# Patient Record
Sex: MALE | Race: WHITE | NOT HISPANIC OR LATINO | Employment: FULL TIME | URBAN - NONMETROPOLITAN AREA
[De-identification: names, ages, dates, MRNs, and addresses within clinical notes are randomized per-mention and may not be internally consistent; named-entity substitution may affect disease eponyms.]

---

## 2022-01-04 ENCOUNTER — OFFICE VISIT (OUTPATIENT)
Dept: FAMILY MEDICINE CLINIC | Facility: CLINIC | Age: 27
End: 2022-01-04

## 2022-01-04 VITALS
WEIGHT: 214 LBS | DIASTOLIC BLOOD PRESSURE: 78 MMHG | OXYGEN SATURATION: 99 % | HEIGHT: 68 IN | BODY MASS INDEX: 32.43 KG/M2 | SYSTOLIC BLOOD PRESSURE: 126 MMHG | TEMPERATURE: 96.8 F | HEART RATE: 84 BPM

## 2022-01-04 DIAGNOSIS — Z00.8 ENCOUNTER FOR WORK CAPABILITY ASSESSMENT: ICD-10-CM

## 2022-01-04 DIAGNOSIS — Z00.00 WELL ADULT EXAM: Primary | ICD-10-CM

## 2022-01-04 DIAGNOSIS — Z13.220 SCREENING FOR HYPERLIPIDEMIA: ICD-10-CM

## 2022-01-04 DIAGNOSIS — E66.1 CLASS 1 DRUG-INDUCED OBESITY WITHOUT SERIOUS COMORBIDITY WITH BODY MASS INDEX (BMI) OF 32.0 TO 32.9 IN ADULT: Chronic | ICD-10-CM

## 2022-01-04 PROCEDURE — 99385 PREV VISIT NEW AGE 18-39: CPT | Performed by: PHYSICIAN ASSISTANT

## 2022-01-04 PROCEDURE — 3008F BODY MASS INDEX DOCD: CPT | Performed by: PHYSICIAN ASSISTANT

## 2022-01-04 PROCEDURE — 2014F MENTAL STATUS ASSESS: CPT | Performed by: PHYSICIAN ASSISTANT

## 2022-01-04 NOTE — PROGRESS NOTES
"Subjective   Bautista Rodriguez is a 26 y.o. male.       Chief Complaint -establish care and annual well visit    History of Present Illness -    ROS    He is here today to establish care as a new patient.    Well visit-  He is doing well with no reported significant current or past medical problems.  He states that he is applying for a job hopefully working with  students or .  He is needing a preemployment physical today and TB skin test.  His past medical social and surgical histories were updated.    The following portions of the patient's history were reviewed and updated as appropriate: allergies, current medications, past family history, past medical history, past social history, past surgical history and problem list.    Review of Systems    Objective  Vital signs:  /78 Comment: rechecked  Pulse 84   Temp 96.8 °F (36 °C) (Temporal)   Ht 172.7 cm (68\")   Wt 97.1 kg (214 lb)   SpO2 99%   BMI 32.54 kg/m²     Physical Exam  Vitals and nursing note reviewed.   Constitutional:       Appearance: Normal appearance. He is well-developed.   HENT:      Head: Normocephalic and atraumatic.      Right Ear: Tympanic membrane normal.      Left Ear: Tympanic membrane normal.      Nose: Nose normal.      Mouth/Throat:      Mouth: Mucous membranes are moist.      Pharynx: No oropharyngeal exudate or posterior oropharyngeal erythema.   Eyes:      Extraocular Movements: Extraocular movements intact.      Conjunctiva/sclera: Conjunctivae normal.   Neck:      Thyroid: No thyromegaly.      Trachea: No tracheal deviation.   Cardiovascular:      Rate and Rhythm: Normal rate and regular rhythm.      Heart sounds: Normal heart sounds. No murmur heard.      Pulmonary:      Effort: Pulmonary effort is normal. No respiratory distress.      Breath sounds: Normal breath sounds. No wheezing.   Abdominal:      General: Bowel sounds are normal.      Palpations: Abdomen is soft.      Tenderness: There is no abdominal " tenderness. There is no guarding.   Musculoskeletal:         General: No tenderness. Normal range of motion.      Cervical back: Normal range of motion and neck supple.   Lymphadenopathy:      Cervical: No cervical adenopathy.   Skin:     General: Skin is warm and dry.      Findings: No rash.   Neurological:      General: No focal deficit present.      Mental Status: He is alert and oriented to person, place, and time.   Psychiatric:         Mood and Affect: Mood normal.         Behavior: Behavior normal.         Thought Content: Thought content normal.       Vision Screening (01/04/2022)  Edited by: Rachell Irving PA   Right eye Left eye Both eyes   Without correction 20/25 20/40 20/20     Depression screening was reviewed with patient.  He is not interested in treatment for anxiety or depression at this time.  He was advised conservative measures.               Assessment/Plan     Diagnoses and all orders for this visit:    1. Well adult exam (Primary)  Comments:  Paperwork was filled out for preemployment physical today.    Patient is cleared to work with no restrictions  Orders:  -     Comprehensive Metabolic Panel; Future    2. Screening for hyperlipidemia  -     Lipid Panel; Future    3. 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  He was advised on the importance of maintaining a healthy diet and regular exercise to achieve and maintain healthy weight.  He was advised on the importance of regular dental care and to report any changes in mental health.  He was advised that should he desire any counseling or treatment for depression or anxiety he is welcome to call the office and that will be provided.  He was advised to continue cessation of tobacco, alcohol, and  drugs.        Patient was given instructions and counseling regarding his condition or for health maintenance advice. Please see specific information pulled into the AVS if appropriate      This document has been electronically signed by:  Rachell Irving PA-C

## 2022-01-05 PROBLEM — E66.1 CLASS 1 DRUG-INDUCED OBESITY WITHOUT SERIOUS COMORBIDITY WITH BODY MASS INDEX (BMI) OF 32.0 TO 32.9 IN ADULT: Status: ACTIVE | Noted: 2022-01-05

## 2022-01-05 NOTE — PATIENT INSTRUCTIONS
Calorie Counting for Weight Loss  Calories are units of energy. Your body needs a certain number of calories from food to keep going throughout the day. When you eat or drink more calories than your body needs, your body stores the extra calories mostly as fat. When you eat or drink fewer calories than your body needs, your body burns fat to get the energy it needs.  Calorie counting means keeping track of how many calories you eat and drink each day. Calorie counting can be helpful if you need to lose weight. If you eat fewer calories than your body needs, you should lose weight. Ask your health care provider what a healthy weight is for you.  For calorie counting to work, you will need to eat the right number of calories each day to lose a healthy amount of weight per week. A dietitian can help you figure out how many calories you need in a day and will suggest ways to reach your calorie goal.  · A healthy amount of weight to lose each week is usually 1-2 lb (0.5-0.9 kg). This usually means that your daily calorie intake should be reduced by 500-750 calories.  · Eating 1,200-1,500 calories a day can help most women lose weight.  · Eating 1,500-1,800 calories a day can help most men lose weight.  What do I need to know about calorie counting?  Work with your health care provider or dietitian to determine how many calories you should get each day. To meet your daily calorie goal, you will need to:  · Find out how many calories are in each food that you would like to eat. Try to do this before you eat.  · Decide how much of the food you plan to eat.  · Keep a food log. Do this by writing down what you ate and how many calories it had.  To successfully lose weight, it is important to balance calorie counting with a healthy lifestyle that includes regular activity.  Where do I find calorie information?    The number of calories in a food can be found on a Nutrition Facts label. If a food does not have a Nutrition Facts  label, try to look up the calories online or ask your dietitian for help.  Remember that calories are listed per serving. If you choose to have more than one serving of a food, you will have to multiply the calories per serving by the number of servings you plan to eat. For example, the label on a package of bread might say that a serving size is 1 slice and that there are 90 calories in a serving. If you eat 1 slice, you will have eaten 90 calories. If you eat 2 slices, you will have eaten 180 calories.  How do I keep a food log?  After each time that you eat, record the following in your food log as soon as possible:  · What you ate. Be sure to include toppings, sauces, and other extras on the food.  · How much you ate. This can be measured in cups, ounces, or number of items.  · How many calories were in each food and drink.  · The total number of calories in the food you ate.  Keep your food log near you, such as in a pocket-sized notebook or on an rachell or website on your mobile phone. Some programs will calculate calories for you and show you how many calories you have left to meet your daily goal.  What are some portion-control tips?  · Know how many calories are in a serving. This will help you know how many servings you can have of a certain food.  · Use a measuring cup to measure serving sizes. You could also try weighing out portions on a kitchen scale. With time, you will be able to estimate serving sizes for some foods.  · Take time to put servings of different foods on your favorite plates or in your favorite bowls and cups so you know what a serving looks like.  · Try not to eat straight from a food's packaging, such as from a bag or box. Eating straight from the package makes it hard to see how much you are eating and can lead to overeating. Put the amount you would like to eat in a cup or on a plate to make sure you are eating the right portion.  · Use smaller plates, glasses, and bowls for smaller  portions and to prevent overeating.  · Try not to multitask. For example, avoid watching TV or using your computer while eating. If it is time to eat, sit down at a table and enjoy your food. This will help you recognize when you are full. It will also help you be more mindful of what and how much you are eating.  What are tips for following this plan?  Reading food labels  · Check the calorie count compared with the serving size. The serving size may be smaller than what you are used to eating.  · Check the source of the calories. Try to choose foods that are high in protein, fiber, and vitamins, and low in saturated fat, trans fat, and sodium.  Shopping  · Read nutrition labels while you shop. This will help you make healthy decisions about which foods to buy.  · Pay attention to nutrition labels for low-fat or fat-free foods. These foods sometimes have the same number of calories or more calories than the full-fat versions. They also often have added sugar, starch, or salt to make up for flavor that was removed with the fat.  · Make a grocery list of lower-calorie foods and stick to it.  Cooking  · Try to cook your favorite foods in a healthier way. For example, try baking instead of frying.  · Use low-fat dairy products.  Meal planning  · Use more fruits and vegetables. One-half of your plate should be fruits and vegetables.  · Include lean proteins, such as chicken, turkey, and fish.  Lifestyle  Each week, aim to do one of the following:  · 150 minutes of moderate exercise, such as walking.  · 75 minutes of vigorous exercise, such as running.  General information  · Know how many calories are in the foods you eat most often. This will help you calculate calorie counts faster.  · Find a way of tracking calories that works for you. Get creative. Try different apps or programs if writing down calories does not work for you.  What foods should I eat?    · Eat nutritious foods. It is better to have a nutritious,  high-calorie food, such as an avocado, than a food with few nutrients, such as a bag of potato chips.  · Use your calories on foods and drinks that will fill you up and will not leave you hungry soon after eating.  ? Examples of foods that fill you up are nuts and nut butters, vegetables, lean proteins, and high-fiber foods such as whole grains. High-fiber foods are foods with more than 5 g of fiber per serving.  · Pay attention to calories in drinks. Low-calorie drinks include water and unsweetened drinks.  The items listed above may not be a complete list of foods and beverages you can eat. Contact a dietitian for more information.  What foods should I limit?  Limit foods or drinks that are not good sources of vitamins, minerals, or protein or that are high in unhealthy fats. These include:  · Candy.  · Other sweets.  · Sodas, specialty coffee drinks, alcohol, and juice.  The items listed above may not be a complete list of foods and beverages you should avoid. Contact a dietitian for more information.  How do I count calories when eating out?  · Pay attention to portions. Often, portions are much larger when eating out. Try these tips to keep portions smaller:  ? Consider sharing a meal instead of getting your own.  ? If you get your own meal, eat only half of it. Before you start eating, ask for a container and put half of your meal into it.  ? When available, consider ordering smaller portions from the menu instead of full portions.  · Pay attention to your food and drink choices. Knowing the way food is cooked and what is included with the meal can help you eat fewer calories.  ? If calories are listed on the menu, choose the lower-calorie options.  ? Choose dishes that include vegetables, fruits, whole grains, low-fat dairy products, and lean proteins.  ? Choose items that are boiled, broiled, grilled, or steamed. Avoid items that are buttered, battered, fried, or served with cream sauce. Items labeled as  crispy are usually fried, unless stated otherwise.  ? Choose water, low-fat milk, unsweetened iced tea, or other drinks without added sugar. If you want an alcoholic beverage, choose a lower-calorie option, such as a glass of wine or light beer.  ? Ask for dressings, sauces, and syrups on the side. These are usually high in calories, so you should limit the amount you eat.  ? If you want a salad, choose a garden salad and ask for grilled meats. Avoid extra toppings such as mann, cheese, or fried items. Ask for the dressing on the side, or ask for olive oil and vinegar or lemon to use as dressing.  · Estimate how many servings of a food you are given. Knowing serving sizes will help you be aware of how much food you are eating at restaurants.  Where to find more information  · Centers for Disease Control and Prevention: www.cdc.gov  · [place] Department of Agriculture: [employer].gov  Summary  · Calorie counting means keeping track of how many calories you eat and drink each day. If you eat fewer calories than your body needs, you should lose weight.  · A healthy amount of weight to lose per week is usually 1-2 lb (0.5-0.9 kg). This usually means reducing your daily calorie intake by 500-750 calories.  · The number of calories in a food can be found on a Nutrition Facts label. If a food does not have a Nutrition Facts label, try to look up the calories online or ask your dietitian for help.  · Use smaller plates, glasses, and bowls for smaller portions and to prevent overeating.  · Use your calories on foods and drinks that will fill you up and not leave you hungry shortly after a meal.  This information is not intended to replace advice given to you by your health care provider. Make sure you discuss any questions you have with your health care provider.  Document Revised: 01/28/2021 Document Reviewed: 01/28/2021  Elsevier Patient Education © 2021 [employer] Inc[employer]      Major Depressive Disorder, Adult  Major depressive  disorder (MDD) is a mental health condition. It may also be called clinical depression or unipolar depression. MDD causes symptoms of sadness, hopelessness, and loss of interest in things. These symptoms last most of the day, almost every day, for 2 weeks. MDD can also cause physical symptoms. It can interfere with relationships and with everyday activities, such as work, school, and activities that are usually pleasant.  MDD may be mild, moderate, or severe. It may be single-episode MDD, which happens once, or recurrent MDD, which may occur multiple times.  What are the causes?  The exact cause of this condition is not known. MDD is most likely caused by a combination of things, which may include:  · Your personality traits.  · Cutler or conditioned behaviors or thoughts or feelings that reinforce negativity.  · Any alcohol or substance misuse.  · Long-term (chronic) physical or mental health illness.  · Going through a traumatic experience or major life changes.  What increases the risk?  The following factors may make someone more likely to develop MDD:  · A family history of depression.  · Being a woman.  · Troubled family relationships.  · Abnormally low levels of certain brain chemicals.  · Traumatic or painful events in childhood, especially abuse or loss of a parent.  · A lot of stress from life experiences, such as poor living conditions or discrimination.  · Chronic physical illness or other mental health disorders.  What are the signs or symptoms?  The main symptoms of MDD usually include:  · Constant depressed or irritable mood.  · A loss of interest in things and activities.  Other symptoms include:  · Sleeping or eating too much or too little.  · Unexplained weight gain or weight loss.  · Tiredness or low energy.  · Being agitated, restless, or weak.  · Feeling hopeless, worthless, or guilty.  · Trouble thinking clearly or making decisions.  · Thoughts of suicide or thoughts of harming  others.  · Isolating oneself or avoiding other people or activities.  · Trouble completing tasks, work, or any normal obligations.  Severe symptoms of this condition may include:  · Psychotic depression.This may include false beliefs, or delusions. It may also include seeing, hearing, tasting, smelling, or feeling things that are not real (hallucinations).  · Chronic depression or persistent depressive disorder. This is low-level depression that lasts for at least 2 years.  · Melancholic depression, or feeling extremely sad and hopeless.  · Catatonic depression, which includes trouble speaking and trouble moving.  How is this diagnosed?  This condition may be diagnosed based on:  · Your symptoms.  · Your medical and mental health history. You may be asked questions about your lifestyle, including any drug and alcohol use.  · A physical exam.  · Blood tests to rule out other conditions.  MDD is confirmed if you have the following symptoms most of the day, nearly every day, in a 2-week period:  · Either a depressed mood or loss of interest.  · At least four other MDD symptoms.  How is this treated?  This condition is usually treated by mental health professionals, such as psychologists, psychiatrists, and clinical social workers. You may need more than one type of treatment. Treatment may include:  · Psychotherapy, also called talk therapy or counseling. Types of psychotherapy include:  ? Cognitive behavioral therapy (CBT). This teaches you to recognize unhealthy feelings, thoughts, and behaviors, and replace them with positive thoughts and actions.  ? Interpersonal therapy (IPT). This helps you to improve the way you communicate with others or relate to them.  ? Family therapy. This treatment includes members of your family.  · Medicines to treat anxiety and depression. These medicines help to balance the brain chemicals that affect your emotions.  · Lifestyle changes. You may be asked to:  ? Limit alcohol use and  avoid drug use.  ? Get regular exercise.  ? Get plenty of sleep.  ? Make healthy eating choices.  ? Spend more time outdoors.  · Brain stimulation. This may be done if symptoms are very severe and other treatments have not worked. Examples of this treatment are electroconvulsive therapy and transcranial magnetic stimulation.  Follow these instructions at home:  Activity  · Exercise regularly and spend time outdoors.  · Find activities that you enjoy doing, and make time to do them.  · Find healthy ways to manage stress, such as:  ? Meditation or deep breathing.  ? Spending time in nature.  ? Journaling.  · Return to your normal activities as told by your health care provider. Ask your health care provider what activities are safe for you.  Alcohol and drug use  · If you drink alcohol:  ? Limit how much you use to:  § 0-1 drink a day for women who are not pregnant.  § 0-2 drinks a day for men.  ? Be aware of how much alcohol is in your drink. In the [place] one drink equals one 12 oz bottle of beer (355 mL), one 5 oz glass of wine (148 mL), or one 1½ oz glass of hard liquor (44 mL).  ? Discuss your alcohol use with your health care provider. Alcohol can affect any antidepressant medicines you are taking.  · Discuss any drug use with your health care provider.  General instructions    · Take over-the-counter and prescription medicines only as told by your health care provider.  · Eat a healthy diet and get plenty of sleep.  · Consider joining a support group. Your health care provider may be able to recommend one.  · Keep all follow-up visits as told by your health care provider. This is important.    Where to find more information  · National [place] on Mental Illness: www.sebas.org  · [place] National [place] of Mental Health: www.nimh.nih.gov  Contact a health care provider if:  · Your symptoms get worse.  · You develop new symptoms.  Get help right away if:  · You self-harm.  · You have serious thoughts about hurting  yourself or others.  · You hallucinate.  If you ever feel like you may hurt yourself or others, or have thoughts about taking your own life, get help right away. Go to your nearest emergency department or:  · Call your local emergency services (771 in the [place]  · Call a suicide crisis helpline, such as the National Suicide Prevention Lifeline at 1-629.618.6004. This is open 24 hours a day in the [place]  · Text the Crisis Text Line at 728866 (in the [place]  Summary  · Major depressive disorder (MDD) is a mental health condition. MDD causes symptoms of sadness, hopelessness, and loss of interest in things. These symptoms last most of the day, almost every day, for 2 weeks.  · The symptoms of MDD can interfere with relationships and with everyday activities.  · Treatments and support are available for people who develop MDD. You may need more than one type of treatment.  · Get help right away if you have serious thoughts about hurting yourself or others.  This information is not intended to replace advice given to you by your health care provider. Make sure you discuss any questions you have with your health care provider.  Document Revised: 11/28/2020 Document Reviewed: 11/28/2020  Elsevier Patient Education © 2021 [employer]vier Inc.

## 2022-01-06 ENCOUNTER — LAB (OUTPATIENT)
Dept: FAMILY MEDICINE CLINIC | Facility: CLINIC | Age: 27
End: 2022-01-06

## 2022-01-06 DIAGNOSIS — Z13.220 SCREENING FOR HYPERLIPIDEMIA: ICD-10-CM

## 2022-01-06 DIAGNOSIS — Z00.00 WELL ADULT EXAM: ICD-10-CM

## 2022-01-11 ENCOUNTER — PATIENT ROUNDING (BHMG ONLY) (OUTPATIENT)
Dept: FAMILY MEDICINE CLINIC | Facility: CLINIC | Age: 27
End: 2022-01-11

## 2022-01-11 NOTE — PROGRESS NOTES
January 11, 2022    Hello, may I speak with Bautista Rodriguez?    My name is Cecilia Briceno     I am  with [facility]  [address] [place] [id number] [phone].    Before we get started may I verify your date of birth? 1995    I am calling to officially welcome you to our practice and ask about your recent visit. Is this a good time to talk? yes    Tell me about your visit with us. What things went well?  Ms Lovett listened to me and I had no issues. I liked the office.        We're always looking for ways to make our patients' experiences even better. Do you have recommendations on ways we may improve?  no    Overall were you satisfied with your first visit to our practice? yes       I appreciate you taking the time to speak with me today. Is there anything else I can do for you? no      Thank you, and have a great day.

## 2022-01-14 ENCOUNTER — TELEPHONE (OUTPATIENT)
Dept: FAMILY MEDICINE CLINIC | Facility: CLINIC | Age: 27
End: 2022-01-14

## 2022-01-14 DIAGNOSIS — E66.1 CLASS 1 DRUG-INDUCED OBESITY WITHOUT SERIOUS COMORBIDITY WITH BODY MASS INDEX (BMI) OF 32.0 TO 32.9 IN ADULT: Primary | ICD-10-CM

## 2022-01-14 NOTE — TELEPHONE ENCOUNTER
Called patient and his voicemail box is full. Will send patient a letter.    ----- Message from DAREN Sibley sent at 1/14/2022  3:26 PM EST -----  I put in the order.  Thanks  ----- Message -----  From: Fifi Paredes MA  Sent: 1/14/2022   3:20 PM EST  To: DAREN Sibley    It will need to be reordered since it was collected on in the office. It was hemolyzed so that's why it needs to be redrawn.   ----- Message -----  From: Rachell Irving PA  Sent: 1/14/2022   3:08 PM EST  To: Fifi Paredes MA    Okay. Please inform the patient to come back in and have it redrawn. Can they use the existing order do you need me to place a new order for CMP? If it was not run properly then they should be able to use the existing order.  ----- Message -----  From: Fifi Paredes MA  Sent: 1/12/2022   9:29 AM EST  To: DAREN Sibley    Lab called and said that he needs his CMP redrawn.

## 2022-01-20 ENCOUNTER — LAB (OUTPATIENT)
Dept: FAMILY MEDICINE CLINIC | Facility: CLINIC | Age: 27
End: 2022-01-20

## 2022-01-20 DIAGNOSIS — Z00.8 ENCOUNTER FOR WORK CAPABILITY ASSESSMENT: Primary | ICD-10-CM

## 2022-01-20 DIAGNOSIS — E66.1 CLASS 1 DRUG-INDUCED OBESITY WITHOUT SERIOUS COMORBIDITY WITH BODY MASS INDEX (BMI) OF 32.0 TO 32.9 IN ADULT: ICD-10-CM

## 2022-01-20 PROCEDURE — 36415 COLL VENOUS BLD VENIPUNCTURE: CPT | Performed by: PHYSICIAN ASSISTANT

## 2022-01-20 PROCEDURE — 80053 COMPREHEN METABOLIC PANEL: CPT | Performed by: PHYSICIAN ASSISTANT

## 2022-01-21 DIAGNOSIS — Z00.00 ANNUAL PHYSICAL EXAM: Primary | ICD-10-CM

## 2022-01-21 LAB
ALBUMIN SERPL-MCNC: 4.3 G/DL (ref 3.5–5.2)
ALBUMIN/GLOB SERPL: 1.3 G/DL
ALP SERPL-CCNC: 79 U/L (ref 39–117)
ALT SERPL W P-5'-P-CCNC: 40 U/L (ref 1–41)
ANION GAP SERPL CALCULATED.3IONS-SCNC: 11.6 MMOL/L (ref 5–15)
AST SERPL-CCNC: 23 U/L (ref 1–40)
BILIRUB SERPL-MCNC: 0.4 MG/DL (ref 0–1.2)
BUN SERPL-MCNC: 13 MG/DL (ref 6–20)
BUN/CREAT SERPL: 12.5 (ref 7–25)
CALCIUM SPEC-SCNC: 9.8 MG/DL (ref 8.6–10.5)
CHLORIDE SERPL-SCNC: 103 MMOL/L (ref 98–107)
CO2 SERPL-SCNC: 27.4 MMOL/L (ref 22–29)
CREAT SERPL-MCNC: 1.04 MG/DL (ref 0.76–1.27)
GFR SERPL CREATININE-BSD FRML MDRD: 85 ML/MIN/1.73
GLOBULIN UR ELPH-MCNC: 3.2 GM/DL
GLUCOSE SERPL-MCNC: 90 MG/DL (ref 65–99)
POTASSIUM SERPL-SCNC: 4.1 MMOL/L (ref 3.5–5.2)
PROT SERPL-MCNC: 7.5 G/DL (ref 6–8.5)
SODIUM SERPL-SCNC: 142 MMOL/L (ref 136–145)

## 2022-01-25 DIAGNOSIS — Z79.899 ENCOUNTER FOR LONG-TERM (CURRENT) USE OF MEDICATIONS: Primary | ICD-10-CM

## 2023-03-22 ENCOUNTER — OFFICE VISIT (OUTPATIENT)
Dept: FAMILY MEDICINE CLINIC | Facility: CLINIC | Age: 28
End: 2023-03-22
Payer: COMMERCIAL

## 2023-03-22 VITALS
BODY MASS INDEX: 32.29 KG/M2 | HEIGHT: 69 IN | WEIGHT: 218 LBS | OXYGEN SATURATION: 97 % | SYSTOLIC BLOOD PRESSURE: 130 MMHG | HEART RATE: 111 BPM | DIASTOLIC BLOOD PRESSURE: 80 MMHG | TEMPERATURE: 98 F

## 2023-03-22 DIAGNOSIS — M67.911 ROTATOR CUFF DISORDER, RIGHT: ICD-10-CM

## 2023-03-22 DIAGNOSIS — K62.5 BRBPR (BRIGHT RED BLOOD PER RECTUM): Primary | ICD-10-CM

## 2023-03-22 DIAGNOSIS — G89.29 CHRONIC RIGHT SHOULDER PAIN: ICD-10-CM

## 2023-03-22 DIAGNOSIS — M25.511 CHRONIC RIGHT SHOULDER PAIN: ICD-10-CM

## 2023-03-22 DIAGNOSIS — M25.311 SHOULDER INSTABILITY, RIGHT: ICD-10-CM

## 2023-03-22 PROCEDURE — 99214 OFFICE O/P EST MOD 30 MIN: CPT | Performed by: PHYSICIAN ASSISTANT

## 2023-03-22 PROCEDURE — 1159F MED LIST DOCD IN RCRD: CPT | Performed by: PHYSICIAN ASSISTANT

## 2023-03-22 PROCEDURE — 1160F RVW MEDS BY RX/DR IN RCRD: CPT | Performed by: PHYSICIAN ASSISTANT

## 2023-03-22 NOTE — PROGRESS NOTES
"    Subjective        Chief Complaint  Shoulder Pain    Subjective      History of Present Illness  Bautista Rodriguez is a 27 y.o. male who presents today to [facility] for Shoulder Pain. Past medical history is significant for asthma, obesity per BMI, and colorblindness.     Shoulder Pain:  Bautista reports several years of intermittent right shoulder pain.  This comes and goes depending on his activity level.  He has a 4-month-old child and with recent bending and lifting, he has had worsening of his shoulder pain. He feels popping and clicking in the shoulder at times. He denies any falls or injuries.  No previous imaging or physical therapy.  He takes ibuprofen regularly without resolution.  He has a history of wrestling competitively for several years.  He was told remotely by medical provider that he may need a rotator cuff repair.  Complete details unavailable    BRBPR:   Family also reports noting bright red blood per rectum intermittently.  This can occur with or without bowel movement.  He denies any significantly hard or difficult to pass bowel movements.  His bowel movements are generally not painful.  He does note an increase in the bleeding with straining or heavy lifting.  Denies any family history of colon cancer or inflammatory bowel disease.  No reports of abdominal pain.    No current outpatient medications on file.      No Known Allergies    Objective     Objective   Vital Signs:  Blood Pressure 130/80   Pulse 111   Temperature 98 °F (36.7 °C) (Temporal)   Height 175.3 cm (69\")   Weight 98.9 kg (218 lb)   Oxygen Saturation 97%   Body Mass Index 32.19 kg/m²   Estimated body mass index is 32.19 kg/m² as calculated from the following:    Height as of this encounter: 175.3 cm (69\").    Weight as of this encounter: 98.9 kg (218 lb).        Past Medical History:   Diagnosis Date   • Allergic    • Asthma    • Class 1 drug-induced obesity without serious comorbidity with " body mass index (BMI) of 32.0 to 32.9 in adult 1/5/2022   • Color blindness    • Urinary tract infection      No past surgical history on file.  Social History     Socioeconomic History   • Marital status:    Tobacco Use   • Smoking status: Never   • Smokeless tobacco: Never   Vaping Use   • Vaping Use: Never used   Substance and Sexual Activity   • Alcohol use: Not Currently   • Drug use: Never   • Sexual activity: Yes      Physical Exam  Vitals and nursing note reviewed.   Constitutional:       General: He is not in acute distress.     Appearance: He is well-developed. He is not diaphoretic.   HENT:      Head: Normocephalic and atraumatic.   Eyes:      General: No scleral icterus.        Right eye: No discharge.         Left eye: No discharge.      Conjunctiva/sclera: Conjunctivae normal.   Cardiovascular:      Rate and Rhythm: Normal rate and regular rhythm.      Heart sounds: Normal heart sounds. No murmur heard.    No friction rub. No gallop.   Pulmonary:      Effort: Pulmonary effort is normal. No respiratory distress.      Breath sounds: Normal breath sounds. No wheezing or rales.   Chest:      Chest wall: No tenderness.   Musculoskeletal:         General: Tenderness (Tenderness to palpation up the upper and anterior shoulder musculature. Has little range of motion of the subscapularis with difficulty with internal rotation.) present. Normal range of motion.      Cervical back: Normal range of motion and neck supple.   Skin:     General: Skin is warm and dry.      Coloration: Skin is not pale.      Findings: No erythema or rash.   Neurological:      Mental Status: He is alert and oriented to person, place, and time.   Psychiatric:         Behavior: Behavior normal.        Result Review :  The following data was reviewed by: DAREN Dozier on 03/22/2023:  No visits with results within 3 Month(s) from this visit.   Latest known visit with results is:   Lab on 01/20/2022   Component Date Value Ref  Range Status   • Glucose 01/20/2022 90  65 - 99 mg/dL Final   • BUN 01/20/2022 13  6 - 20 mg/dL Final   • Creatinine 01/20/2022 1.04  0.76 - 1.27 mg/dL Final   • Sodium 01/20/2022 142  136 - 145 mmol/L Final   • Potassium 01/20/2022 4.1  3.5 - 5.2 mmol/L Final   • Chloride 01/20/2022 103  98 - 107 mmol/L Final   • CO2 01/20/2022 27.4  22.0 - 29.0 mmol/L Final   • Calcium 01/20/2022 9.8  8.6 - 10.5 mg/dL Final   • Total Protein 01/20/2022 7.5  6.0 - 8.5 g/dL Final   • Albumin 01/20/2022 4.30  3.50 - 5.20 g/dL Final   • ALT (SGPT) 01/20/2022 40  1 - 41 U/L Final   • AST (SGOT) 01/20/2022 23  1 - 40 U/L Final   • Alkaline Phosphatase 01/20/2022 79  39 - 117 U/L Final   • Total Bilirubin 01/20/2022 0.4  0.0 - 1.2 mg/dL Final   • eGFR Non  Amer 01/20/2022 85  >60 mL/min/1.73 Final   • Globulin 01/20/2022 3.2  gm/dL Final   • A/G Ratio 01/20/2022 1.3  g/dL Final   • BUN/Creatinine Ratio 01/20/2022 12.5  7.0 - 25.0 Final   • Anion Gap 01/20/2022 11.6  5.0 - 15.0 mmol/L Final            Assessment / Plan         Assessment   Diagnoses and all orders for this visit:    1. BRBPR (bright red blood per rectum) (Primary)  • Recommend high-fiber diet.  Start regular stool softener such as docusate sodium 100 mg twice daily OTC.  • Encouraged obtaining OTC suppositories specifically for hemorrhoids to see if improvement is noted.  • We will refer to Dr. Burch with gastroenterology for evaluation and recommendations.  -     Ambulatory Referral to Gastroenterology    2. Chronic right shoulder pain  3. Shoulder instability, right  4. Rotator cuff disorder, right  • Recommend regular application of heat/ice to the affected area.  • Continue use of OTC anti-inflammatories such as ibuprofen or naproxen.  • We will evaluate further with MRI of the right shoulder given his symptoms have been present and intermittent for several years, history of competitive wrestling, and previously being told he may need a rotator cuff  repair.  • If injury noted, will consider orthopedic surgery referral.  -     MRI Shoulder Right Without Contrast; Future    Health Maintenance  • Consider updating Tdap if not performed within the last 10 years.    I spent 37 minutes caring for Bautista on this date of service. This time includes time spent by me in the following activities:preparing for the visit, performing a medically appropriate examination and/or evaluation , counseling and educating the patient/family/caregiver, ordering medications, tests, or procedures and documenting information in the medical record    Follow Up   Return in about 6 weeks (around 5/3/2023).    Patient was given instructions and counseling regarding his condition or for health maintenance advice. Please see specific information pulled into the AVS if appropriate.       This document has been electronically signed by DAREN Dozier   March 22, 2023 09:10 EDT    Dictated Utilizing Dragon Dictation: Part of this note may be an electronic transcription/translation of spoken language to printed text using the Dragon Dictation System.

## 2023-04-26 ENCOUNTER — TELEPHONE (OUTPATIENT)
Dept: FAMILY MEDICINE CLINIC | Facility: CLINIC | Age: 28
End: 2023-04-26
Payer: COMMERCIAL

## 2023-04-26 DIAGNOSIS — M25.511 CHRONIC RIGHT SHOULDER PAIN: Primary | ICD-10-CM

## 2023-04-26 DIAGNOSIS — G89.29 CHRONIC RIGHT SHOULDER PAIN: Primary | ICD-10-CM

## 2023-04-26 NOTE — TELEPHONE ENCOUNTER
----- Message from Jada Evangelista sent at 4/26/2023  9:33 AM EDT -----  Insurance denied the MRI. Patient is willing to try to PT, if you will drop the order I will get him taken care of

## 2023-10-02 ENCOUNTER — OFFICE VISIT (OUTPATIENT)
Dept: PSYCHIATRY | Facility: CLINIC | Age: 28
End: 2023-10-02
Payer: COMMERCIAL

## 2023-10-02 DIAGNOSIS — F41.9 ANXIETY DISORDER, UNSPECIFIED TYPE: Primary | ICD-10-CM

## 2023-10-02 DIAGNOSIS — F32.A DEPRESSION, UNSPECIFIED DEPRESSION TYPE: ICD-10-CM

## 2023-10-02 PROCEDURE — 1160F RVW MEDS BY RX/DR IN RCRD: CPT

## 2023-10-02 PROCEDURE — 1159F MED LIST DOCD IN RCRD: CPT

## 2023-10-02 PROCEDURE — 90792 PSYCH DIAG EVAL W/MED SRVCS: CPT

## 2023-10-02 RX ORDER — SERTRALINE HYDROCHLORIDE 25 MG/1
25 TABLET, FILM COATED ORAL DAILY
Qty: 30 TABLET | Refills: 1 | Status: SHIPPED | OUTPATIENT
Start: 2023-10-02
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
## 2023-11-14 ENCOUNTER — OFFICE VISIT (OUTPATIENT)
Dept: PSYCHIATRY | Facility: CLINIC | Age: 28
End: 2023-11-14
Payer: COMMERCIAL

## 2023-11-14 VITALS
WEIGHT: 217.2 LBS | OXYGEN SATURATION: 96 % | HEART RATE: 92 BPM | SYSTOLIC BLOOD PRESSURE: 134 MMHG | HEIGHT: 69 IN | BODY MASS INDEX: 32.17 KG/M2 | TEMPERATURE: 98 F | DIASTOLIC BLOOD PRESSURE: 83 MMHG

## 2023-11-14 DIAGNOSIS — F32.A DEPRESSION, UNSPECIFIED DEPRESSION TYPE: ICD-10-CM

## 2023-11-14 DIAGNOSIS — F41.9 ANXIETY DISORDER, UNSPECIFIED TYPE: Primary | ICD-10-CM

## 2023-11-14 PROCEDURE — 99214 OFFICE O/P EST MOD 30 MIN: CPT

## 2023-11-14 PROCEDURE — 1160F RVW MEDS BY RX/DR IN RCRD: CPT

## 2023-11-14 PROCEDURE — 1159F MED LIST DOCD IN RCRD: CPT

## 2023-11-14 NOTE — PROGRESS NOTES
"Subjective   Bautista Rodriguez is a 28 y.o. male who is here today for medication management follow up encounter.     Chief Complaint:  anxiety, depression    HPI:  History of Present Illness      Patient denies negative side effects associated with current regimen.  His overall he feels that he is able to more easily redirect his thoughts and feels that anxiety has gotten \"some better.\"  He reports that he still finds himself struggling most days with anxiety.  Feels that enjoyment has gotten a little easier.Sleep is more restful. Denies any nightmares. Less hypervigilance.  Denies any appetite changes. Body mass index is 32.06 kg/m².  Denies self-harm.  Denies AVH.  Denies SI and HI.     Past Psych History: Patient has been previously diagnosed with anxiety and depression.  Currently under therapy services with Gabriela Griffith LCSW.  Denies a history of TBI or seizures.  Denies any knowledge of exposure to illicit substance or toxins while in utero.    Previous Psych Meds: None    Substance Abuse: Patient denies any history of or present illicit substance use, EtOH, nicotine.  Variable caffeine intake.    Social History: Patient was born and raised locally to logical mom and dad.  Biological parents  when he was in high school.  1 sister.  High school graduate, college graduate.   x 2 years.  Daughter 10 months of age.  Currently employed at [employer].  Previously incarcerated as described above, reports that he was held in the drunk tank x 2 nights at [facility] half-way [place].  Currently on pretrial diversion's.  No previous  service.    Family Psychiatric History:  family history includes Arthritis in his maternal grandmother; COPD in his paternal grandmother; Cancer in his maternal grandmother; Diabetes in his maternal grandfather; Drug abuse in his father; Hypertension in his maternal grandfather; Miscarriages / Stillbirths in his mother.    Medical/Surgical History:  Past Medical History: "   Diagnosis Date    Allergic     Asthma     Class 1 drug-induced obesity without serious comorbidity with body mass index (BMI) of 32.0 to 32.9 in adult 1/5/2022    Color blindness     Urinary tract infection      History reviewed. No pertinent surgical history.    No Known Allergies        Current Medications:   Current Outpatient Medications   Medication Sig Dispense Refill    sertraline (Zoloft) 50 MG tablet Take 1 tablet by mouth Daily for 90 days. 30 tablet 2     No current facility-administered medications for this visit.         Review of Systems   Constitutional: Negative.    HENT: Negative.     Eyes: Negative.    Respiratory: Negative.     Cardiovascular: Negative.    Gastrointestinal: Negative.    Endocrine: Negative.    Genitourinary: Negative.    Musculoskeletal: Negative.    Skin: Negative.    Allergic/Immunologic: Negative.    Neurological: Negative.    Hematological: Negative.    Psychiatric/Behavioral:  Positive for dysphoric mood. Negative for self-injury, sleep disturbance and suicidal ideas. The patient is nervous/anxious.     denies HEENT, cardiovascular, respiratory, liver, renal, GI/, endocrine, neuro, DERM, hematology, immunology, musculoskeletal disorders.    Objective   Physical Exam  Vitals reviewed.   Constitutional:       Appearance: Normal appearance.   HENT:      Head: Normocephalic.      Nose: Nose normal.      Mouth/Throat:      Mouth: Mucous membranes are moist.      Pharynx: Oropharynx is clear.   Eyes:      Extraocular Movements: Extraocular movements intact.      Pupils: Pupils are equal, round, and reactive to light.   Cardiovascular:      Rate and Rhythm: Normal rate.   Pulmonary:      Effort: Pulmonary effort is normal.   Musculoskeletal:         General: Normal range of motion.      Cervical back: Normal range of motion.   Skin:     General: Skin is warm and dry.   Neurological:      General: No focal deficit present.      Mental Status: He is alert and oriented to person,  "place, and time.   Psychiatric:         Attention and Perception: Attention and perception normal. He is attentive.         Mood and Affect: Mood and affect normal. Mood is not anxious.         Speech: Speech normal.         Behavior: Behavior normal. Behavior is cooperative.         Thought Content: Thought content normal.         Cognition and Memory: Cognition and memory normal.         Judgment: Judgment normal. Judgment is not impulsive.     Blood pressure 134/83, pulse 92, temperature 98 °F (36.7 °C), height 175.3 cm (69.02\"), weight 98.5 kg (217 lb 3.2 oz), SpO2 96%.    Mental Status Exam:   Hygiene:   good  Cooperation:  Cooperative  Eye Contact:  Good  Psychomotor Behavior:  Appropriate  Affect:  Full range and Appropriate  Hopelessness: Optimistic  Speech:  Normal  Thought Process:  Goal directed and Linear  Thought Content:  Normal, Mood congruent, and bizarre at times  Suicidal:  None  Homicidal:  None  Hallucinations:  None  Delusion:  None  Memory:  Intact  Orientation:  Person, Place, Time, and Situation  Reliability:  fair  Insight:  Fair  Judgement:  Fair  Impulse Control:  Fair  Physical/Medical Issues:  No       Short-term goals: Patient will be compliant with clinic appointments.  Patient will be engaged in therapy, medication compliant with minimal side effects. Patient  will report decrease of symptoms and frequency.    Long-term goals: Patient will have minimal symptoms of  with continued medication management. Patient will be compliant with treatment and appointments.     Strengths: Motivation for treatment, insight  Weaknesses: Coping skills    Prognosis: Guarded dependent on medication/follow up and treatment plan compliance.  Functional Status:  moderate impairment in areas of daily functioning.    Assessment & Plan   Diagnoses and all orders for this visit:    1. Anxiety disorder, unspecified type (Primary)  -     sertraline (Zoloft) 50 MG tablet; Take 1 tablet by mouth Daily for 90 days. "  Dispense: 30 tablet; Refill: 2    2. Depression, unspecified depression type  -     sertraline (Zoloft) 50 MG tablet; Take 1 tablet by mouth Daily for 90 days.  Dispense: 30 tablet; Refill: 2        -UDS reviewed and appropriate  -Increase Zoloft to 75 mg p.o. daily for anxiety and depression  -Recommend continuation and compliance with psychotherapy  -Medications sent pharmacy at this time    Discussed medication and psychotherapy options.  At this time patient is to increase Zoloft to better target the above-noted symptoms.  I've reiterated to him that drugs of the SSRI class can have side effects such as weight gain, sexual dysfunction, insomnia, headache, nausea. Discussed the risks, benefits, and side effects of the medication; client acknowledged and verbally consented.  Patient is aware to contact the [facility] with any worsening of symptom.  Patient is agreeable to go to the ER or call 911 should they begin SI/HI.      Return in about 6 weeks (around 12/26/2023), or if symptoms worsen or fail to improve, for Next scheduled follow up.        This document has been electronically signed by RAF Voss   November 14, 2023 11:28 EST     Errors in dictation may reflect use of voice recognition software and not all errors in transcription may have been detected prior to signing.

## 2023-11-29 DIAGNOSIS — F41.9 ANXIETY DISORDER, UNSPECIFIED TYPE: ICD-10-CM

## 2023-11-29 DIAGNOSIS — F32.A DEPRESSION, UNSPECIFIED DEPRESSION TYPE: ICD-10-CM

## 2023-11-29 RX ORDER — SERTRALINE HYDROCHLORIDE 25 MG/1
25 TABLET, FILM COATED ORAL DAILY
Qty: 30 TABLET | Refills: 1 | OUTPATIENT
Start: 2023-11-29

## 2024-02-29 ENCOUNTER — OFFICE VISIT (OUTPATIENT)
Dept: PSYCHIATRY | Facility: CLINIC | Age: 29
End: 2024-02-29
Payer: MEDICAID

## 2024-02-29 VITALS
HEIGHT: 69 IN | WEIGHT: 218.4 LBS | DIASTOLIC BLOOD PRESSURE: 78 MMHG | OXYGEN SATURATION: 97 % | SYSTOLIC BLOOD PRESSURE: 130 MMHG | BODY MASS INDEX: 32.35 KG/M2 | TEMPERATURE: 98 F | HEART RATE: 75 BPM

## 2024-02-29 DIAGNOSIS — F32.5 MAJOR DEPRESSIVE DISORDER WITH SINGLE EPISODE, IN FULL REMISSION: Primary | ICD-10-CM

## 2024-02-29 DIAGNOSIS — F41.9 ANXIETY DISORDER, UNSPECIFIED TYPE: ICD-10-CM

## 2024-02-29 NOTE — PROGRESS NOTES
Subjective   Bautista Rodriguez is a 28 y.o. male who is here today for medication management follow up encounter.     Chief Complaint:  anxiety, depression    HPI:  History of Present Illness    Patient was unable to keep last encounter related to loss of insurance.  Patient reports that he discontinued Zoloft around 1 January associated with loss of insurance and cost burden.  He reports that he overall feels that he has been doing well.  He feels that anxiety has been manageable.  Does endorse life stressors but feels that he has been able to be more open and redirecting.  Feels that thought processes continue to be more positive.  He is continuing regularly and actively in psychotherapy.  Sleep is doing well averaging 6 to 8 hours per night.  He denies any nightmares or awakenings.  No reported change with appetite. Body mass index is 32.24 kg/m².  He denies any depression symptoms. Denies self-harm.  Denies AVH.  Denies SI and HI.     Past Psych History: Patient has been previously diagnosed with anxiety and depression.  Currently under therapy services with Gabriela Griffith LCSW.  Denies a history of TBI or seizures.  Denies any knowledge of exposure to illicit substance or toxins while in utero.    Previous Psych Meds: None    Substance Abuse: Patient denies any history of or present illicit substance use, EtOH, nicotine.  Variable caffeine intake.    Social History: Patient was born and raised locally to logical mom and dad.  Biological parents  when he was in high school.  1 sister.  High school graduate, college graduate.   x 2 years.  Daughter 10 months of age.  Currently employed at [employer].  Previously incarcerated as described above, reports that he was held in the drunk tank x 2 nights at [facility].  Currently on pretrial diversion's.  No previous  service.    Family Psychiatric History:  family history includes Arthritis in his maternal grandmother; COPD in his  paternal grandmother; Cancer in his maternal grandmother; Diabetes in his maternal grandfather; Drug abuse in his father; Hypertension in his maternal grandfather; Miscarriages / Stillbirths in his mother.    Medical/Surgical History:  Past Medical History:   Diagnosis Date    Allergic     Asthma     Class 1 drug-induced obesity without serious comorbidity with body mass index (BMI) of 32.0 to 32.9 in adult 1/5/2022    Color blindness     Urinary tract infection      History reviewed. No pertinent surgical history.    No Known Allergies        Current Medications:   Current Outpatient Medications   Medication Sig Dispense Refill    sertraline (Zoloft) 50 MG tablet Take 1 tablet by mouth Daily for 90 days. 30 tablet 2     No current facility-administered medications for this visit.         Review of Systems   Constitutional: Negative.    HENT: Negative.     Eyes: Negative.    Respiratory: Negative.     Cardiovascular: Negative.    Gastrointestinal: Negative.    Endocrine: Negative.    Genitourinary: Negative.    Musculoskeletal: Negative.    Skin: Negative.    Allergic/Immunologic: Negative.    Neurological: Negative.    Hematological: Negative.    Psychiatric/Behavioral:  Negative for dysphoric mood, self-injury, sleep disturbance and suicidal ideas. The patient is not nervous/anxious.     denies HEENT, cardiovascular, respiratory, liver, renal, GI/, endocrine, neuro, DERM, hematology, immunology, musculoskeletal disorders.    Objective   Physical Exam  Vitals reviewed.   Constitutional:       Appearance: Normal appearance.   HENT:      Head: Normocephalic.      Nose: Nose normal.      Mouth/Throat:      Mouth: Mucous membranes are moist.      Pharynx: Oropharynx is clear.   Eyes:      Extraocular Movements: Extraocular movements intact.      Pupils: Pupils are equal, round, and reactive to light.   Cardiovascular:      Rate and Rhythm: Normal rate.   Pulmonary:      Effort: Pulmonary effort is normal.  "  Musculoskeletal:         General: Normal range of motion.      Cervical back: Normal range of motion.   Skin:     General: Skin is warm and dry.   Neurological:      General: No focal deficit present.      Mental Status: He is alert and oriented to person, place, and time.   Psychiatric:         Attention and Perception: Attention and perception normal. He is attentive.         Mood and Affect: Mood and affect normal. Mood is not anxious.         Speech: Speech normal.         Behavior: Behavior normal. Behavior is cooperative.         Thought Content: Thought content normal.         Cognition and Memory: Cognition and memory normal.         Judgment: Judgment normal. Judgment is not impulsive.     Blood pressure 130/78, pulse 75, temperature 98 °F (36.7 °C), height 175.3 cm (69.02\"), weight 99.1 kg (218 lb 6.4 oz), SpO2 97%.    Mental Status Exam:   Hygiene:   good  Cooperation:  Cooperative  Eye Contact:  Good  Psychomotor Behavior:  Appropriate  Affect:  Full range and Appropriate  Hopelessness: Optimistic  Speech:  Normal  Thought Process:  Goal directed and Linear  Thought Content:  Normal and Mood congruent  Suicidal:  None  Homicidal:  None  Hallucinations:  None  Delusion:  None  Memory:  Intact  Orientation:  Person, Place, Time, and Situation  Reliability:  fair  Insight:  Fair  Judgement:  Fair  Impulse Control:  Fair  Physical/Medical Issues:  No       Short-term goals: Patient will be compliant with clinic appointments.  Patient will be engaged in therapy, medication compliant with minimal side effects. Patient  will report decrease of symptoms and frequency.    Long-term goals: Patient will have minimal symptoms of  with continued medication management. Patient will be compliant with treatment and appointments.     Strengths: Motivation for treatment, insight  Weaknesses: Coping skills    Prognosis: Guarded dependent on medication/follow up and treatment plan compliance.  Functional Status:  moderate " impairment in areas of daily functioning.    Assessment & Plan   Diagnoses and all orders for this visit:    1. Major depressive disorder with single episode, in full remission (Primary)    2. Anxiety disorder, unspecified type        -Hold Zoloft for now  -Recommend continuation and compliance with psychotherapy      Discussed medication and psychotherapy options.  Patient appears to be progressing with active engagement in psychotherapy and application of coping skills.  No medication at this time.  Patient is to follow-up with me as needed but scheduled in 12 months.  Have discussed that if symptoms return or begin to worsen he can call to be seen sooner. Patient is aware to contact the [facility] with any worsening of symptom.  Patient is agreeable to go to the ER or call 911 should they begin SI/HI.      Return in about 1 year (around 2/28/2025), or if symptoms worsen or fail to improve, for Next scheduled follow up.        This document has been electronically signed by RAF Voss   February 29, 2024 15:05 EST     Errors in dictation may reflect use of voice recognition software and not all errors in transcription may have been detected prior to signing.